# Patient Record
Sex: FEMALE | URBAN - METROPOLITAN AREA
[De-identification: names, ages, dates, MRNs, and addresses within clinical notes are randomized per-mention and may not be internally consistent; named-entity substitution may affect disease eponyms.]

---

## 2023-12-20 LAB
ABO, EXTERNAL RESULT: NORMAL
C. TRACHOMATIS, EXTERNAL RESULT: NORMAL
HEP B, EXTERNAL RESULT: NORMAL
HIV, EXTERNAL RESULT: NORMAL
N. GONORRHOEAE, EXTERNAL RESULT: NORMAL
RH FACTOR, EXTERNAL RESULT: POSITIVE
RPR, EXTERNAL RESULT: NORMAL
RUBELLA TITER, EXTERNAL RESULT: NORMAL

## 2024-07-02 LAB — GBS, EXTERNAL RESULT: NORMAL

## 2024-07-28 ENCOUNTER — ANESTHESIA EVENT (OUTPATIENT)
Facility: HOSPITAL | Age: 34
End: 2024-07-28
Payer: COMMERCIAL

## 2024-07-28 ENCOUNTER — HOSPITAL ENCOUNTER (INPATIENT)
Facility: HOSPITAL | Age: 34
LOS: 2 days | Discharge: HOME | End: 2024-07-30
Attending: OBSTETRICS & GYNECOLOGY | Admitting: OBSTETRICS & GYNECOLOGY
Payer: COMMERCIAL

## 2024-07-28 ENCOUNTER — ANESTHESIA (OUTPATIENT)
Facility: HOSPITAL | Age: 34
End: 2024-07-28
Payer: COMMERCIAL

## 2024-07-28 DIAGNOSIS — R52 PAIN: Primary | ICD-10-CM

## 2024-07-28 PROBLEM — O47.9 UTERINE CONTRACTIONS: Status: ACTIVE | Noted: 2024-07-28

## 2024-07-28 LAB
ABO + RH BLD: NORMAL
BLOOD GROUP ANTIBODIES SERPL: NORMAL
ERYTHROCYTE [DISTWIDTH] IN BLOOD BY AUTOMATED COUNT: 13.8 % (ref 11.5–14.5)
HCT VFR BLD AUTO: 35.7 % (ref 35–47)
HGB BLD-MCNC: 12.3 G/DL (ref 11.5–16)
MCH RBC QN AUTO: 30 PG (ref 26–34)
MCHC RBC AUTO-ENTMCNC: 34.5 G/DL (ref 30–36.5)
MCV RBC AUTO: 87.1 FL (ref 80–99)
NRBC # BLD: 0 K/UL (ref 0–0.01)
NRBC BLD-RTO: 0 PER 100 WBC
PLATELET # BLD AUTO: 187 K/UL (ref 150–400)
PMV BLD AUTO: 12 FL (ref 8.9–12.9)
RBC # BLD AUTO: 4.1 M/UL (ref 3.8–5.2)
RPR SER QL: NONREACTIVE
SPECIMEN EXP DATE BLD: NORMAL
WBC # BLD AUTO: 9.6 K/UL (ref 3.6–11)

## 2024-07-28 PROCEDURE — 6370000000 HC RX 637 (ALT 250 FOR IP): Performed by: ADVANCED PRACTICE MIDWIFE

## 2024-07-28 PROCEDURE — 7100000001 HC PACU RECOVERY - ADDTL 15 MIN

## 2024-07-28 PROCEDURE — 4500000002 HC ER NO CHARGE

## 2024-07-28 PROCEDURE — 3700000025 EPIDURAL BLOCK: Performed by: ANESTHESIOLOGY

## 2024-07-28 PROCEDURE — 7100000000 HC PACU RECOVERY - FIRST 15 MIN

## 2024-07-28 PROCEDURE — 51701 INSERT BLADDER CATHETER: CPT

## 2024-07-28 PROCEDURE — 1100000000 HC RM PRIVATE

## 2024-07-28 PROCEDURE — 6370000000 HC RX 637 (ALT 250 FOR IP): Performed by: MIDWIFE

## 2024-07-28 PROCEDURE — 7220000101 HC DELIVERY VAGINAL/SINGLE

## 2024-07-28 PROCEDURE — 6360000002 HC RX W HCPCS: Performed by: ADVANCED PRACTICE MIDWIFE

## 2024-07-28 PROCEDURE — 36415 COLL VENOUS BLD VENIPUNCTURE: CPT

## 2024-07-28 PROCEDURE — 85027 COMPLETE CBC AUTOMATED: CPT

## 2024-07-28 PROCEDURE — 86901 BLOOD TYPING SEROLOGIC RH(D): CPT

## 2024-07-28 PROCEDURE — 7210000100 HC LABOR FEE PER 1 HR

## 2024-07-28 PROCEDURE — 2500000003 HC RX 250 WO HCPCS: Performed by: ANESTHESIOLOGY

## 2024-07-28 PROCEDURE — 6360000002 HC RX W HCPCS: Performed by: ANESTHESIOLOGY

## 2024-07-28 PROCEDURE — 2580000003 HC RX 258: Performed by: MIDWIFE

## 2024-07-28 PROCEDURE — 0HQ9XZZ REPAIR PERINEUM SKIN, EXTERNAL APPROACH: ICD-10-PCS | Performed by: OBSTETRICS & GYNECOLOGY

## 2024-07-28 PROCEDURE — 10907ZC DRAINAGE OF AMNIOTIC FLUID, THERAPEUTIC FROM PRODUCTS OF CONCEPTION, VIA NATURAL OR ARTIFICIAL OPENING: ICD-10-PCS | Performed by: OBSTETRICS & GYNECOLOGY

## 2024-07-28 PROCEDURE — 2580000003 HC RX 258: Performed by: ANESTHESIOLOGY

## 2024-07-28 PROCEDURE — 86900 BLOOD TYPING SEROLOGIC ABO: CPT

## 2024-07-28 PROCEDURE — 86592 SYPHILIS TEST NON-TREP QUAL: CPT

## 2024-07-28 PROCEDURE — 86850 RBC ANTIBODY SCREEN: CPT

## 2024-07-28 PROCEDURE — 00HU33Z INSERTION OF INFUSION DEVICE INTO SPINAL CANAL, PERCUTANEOUS APPROACH: ICD-10-PCS | Performed by: ANESTHESIOLOGY

## 2024-07-28 RX ORDER — SODIUM CHLORIDE, SODIUM LACTATE, POTASSIUM CHLORIDE, CALCIUM CHLORIDE 600; 310; 30; 20 MG/100ML; MG/100ML; MG/100ML; MG/100ML
INJECTION, SOLUTION INTRAVENOUS CONTINUOUS
Status: DISCONTINUED | OUTPATIENT
Start: 2024-07-28 | End: 2024-07-30 | Stop reason: HOSPADM

## 2024-07-28 RX ORDER — OXYCODONE HYDROCHLORIDE 5 MG/1
10 TABLET ORAL EVERY 4 HOURS PRN
Status: DISCONTINUED | OUTPATIENT
Start: 2024-07-28 | End: 2024-07-30 | Stop reason: HOSPADM

## 2024-07-28 RX ORDER — SODIUM CHLORIDE 9 MG/ML
INJECTION, SOLUTION INTRAVENOUS PRN
Status: DISCONTINUED | OUTPATIENT
Start: 2024-07-28 | End: 2024-07-30 | Stop reason: HOSPADM

## 2024-07-28 RX ORDER — EPHEDRINE SULFATE 50 MG/ML
10 INJECTION INTRAVENOUS
Status: DISPENSED | OUTPATIENT
Start: 2024-07-28 | End: 2024-07-29

## 2024-07-28 RX ORDER — HYDROMORPHONE HYDROCHLORIDE 1 MG/ML
1 INJECTION, SOLUTION INTRAMUSCULAR; INTRAVENOUS; SUBCUTANEOUS ONCE
Status: DISCONTINUED | OUTPATIENT
Start: 2024-07-28 | End: 2024-07-29 | Stop reason: SDUPTHER

## 2024-07-28 RX ORDER — SODIUM CHLORIDE, SODIUM LACTATE, POTASSIUM CHLORIDE, AND CALCIUM CHLORIDE .6; .31; .03; .02 G/100ML; G/100ML; G/100ML; G/100ML
500 INJECTION, SOLUTION INTRAVENOUS PRN
Status: DISCONTINUED | OUTPATIENT
Start: 2024-07-28 | End: 2024-07-30 | Stop reason: HOSPADM

## 2024-07-28 RX ORDER — ONDANSETRON 2 MG/ML
4 INJECTION INTRAMUSCULAR; INTRAVENOUS EVERY 6 HOURS PRN
Status: DISCONTINUED | OUTPATIENT
Start: 2024-07-28 | End: 2024-07-30 | Stop reason: HOSPADM

## 2024-07-28 RX ORDER — ACETAMINOPHEN 325 MG/1
650 TABLET ORAL EVERY 4 HOURS PRN
Status: DISCONTINUED | OUTPATIENT
Start: 2024-07-28 | End: 2024-07-29 | Stop reason: SDUPTHER

## 2024-07-28 RX ORDER — LIDOCAINE HYDROCHLORIDE 10 MG/ML
INJECTION, SOLUTION INFILTRATION; PERINEURAL
Status: DISCONTINUED
Start: 2024-07-28 | End: 2024-07-29 | Stop reason: SDUPTHER

## 2024-07-28 RX ORDER — BUPIVACAINE HYDROCHLORIDE 2.5 MG/ML
INJECTION, SOLUTION EPIDURAL; INFILTRATION; INTRACAUDAL PRN
Status: DISCONTINUED | OUTPATIENT
Start: 2024-07-28 | End: 2024-07-28 | Stop reason: SDUPTHER

## 2024-07-28 RX ORDER — BUPIVACAINE HYDROCHLORIDE 2.5 MG/ML
INJECTION, SOLUTION EPIDURAL; INFILTRATION; INTRACAUDAL
Status: COMPLETED
Start: 2024-07-28 | End: 2024-07-28

## 2024-07-28 RX ORDER — FENTANYL/BUPIVACAINE/NS/PF 2-1250MCG
1-15 PLASTIC BAG, INJECTION (ML) INJECTION CONTINUOUS
Status: DISCONTINUED | OUTPATIENT
Start: 2024-07-28 | End: 2024-07-29 | Stop reason: ALTCHOICE

## 2024-07-28 RX ORDER — LIDOCAINE HCL/EPINEPHRINE/PF 2%-1:200K
VIAL (ML) INJECTION PRN
Status: DISCONTINUED | OUTPATIENT
Start: 2024-07-28 | End: 2024-07-28 | Stop reason: SDUPTHER

## 2024-07-28 RX ORDER — TERBUTALINE SULFATE 1 MG/ML
0.25 INJECTION, SOLUTION SUBCUTANEOUS
Status: ACTIVE | OUTPATIENT
Start: 2024-07-28 | End: 2024-07-29

## 2024-07-28 RX ORDER — NALOXONE HYDROCHLORIDE 0.4 MG/ML
INJECTION, SOLUTION INTRAMUSCULAR; INTRAVENOUS; SUBCUTANEOUS PRN
Status: DISCONTINUED | OUTPATIENT
Start: 2024-07-28 | End: 2024-07-30 | Stop reason: HOSPADM

## 2024-07-28 RX ORDER — SODIUM CHLORIDE 0.9 % (FLUSH) 0.9 %
5-40 SYRINGE (ML) INJECTION EVERY 12 HOURS SCHEDULED
Status: DISCONTINUED | OUTPATIENT
Start: 2024-07-28 | End: 2024-07-30 | Stop reason: HOSPADM

## 2024-07-28 RX ORDER — CARBOPROST TROMETHAMINE 250 UG/ML
250 INJECTION, SOLUTION INTRAMUSCULAR PRN
Status: DISCONTINUED | OUTPATIENT
Start: 2024-07-28 | End: 2024-07-30 | Stop reason: HOSPADM

## 2024-07-28 RX ORDER — DOCUSATE SODIUM 100 MG/1
100 CAPSULE, LIQUID FILLED ORAL 2 TIMES DAILY
Status: DISCONTINUED | OUTPATIENT
Start: 2024-07-28 | End: 2024-07-30 | Stop reason: HOSPADM

## 2024-07-28 RX ORDER — IBUPROFEN 400 MG/1
800 TABLET ORAL EVERY 8 HOURS SCHEDULED
Status: DISCONTINUED | OUTPATIENT
Start: 2024-07-28 | End: 2024-07-30 | Stop reason: HOSPADM

## 2024-07-28 RX ORDER — OXYCODONE HYDROCHLORIDE 5 MG/1
5 TABLET ORAL EVERY 4 HOURS PRN
Status: DISCONTINUED | OUTPATIENT
Start: 2024-07-28 | End: 2024-07-30 | Stop reason: HOSPADM

## 2024-07-28 RX ORDER — FENTANYL CITRATE 50 UG/ML
INJECTION, SOLUTION INTRAMUSCULAR; INTRAVENOUS
Status: DISPENSED
Start: 2024-07-28 | End: 2024-07-29

## 2024-07-28 RX ORDER — ACETAMINOPHEN 500 MG
1000 TABLET ORAL EVERY 8 HOURS SCHEDULED
Status: DISCONTINUED | OUTPATIENT
Start: 2024-07-28 | End: 2024-07-30 | Stop reason: HOSPADM

## 2024-07-28 RX ORDER — SODIUM CHLORIDE 0.9 % (FLUSH) 0.9 %
5-40 SYRINGE (ML) INJECTION PRN
Status: DISCONTINUED | OUTPATIENT
Start: 2024-07-28 | End: 2024-07-30 | Stop reason: HOSPADM

## 2024-07-28 RX ORDER — ONDANSETRON 4 MG/1
4 TABLET, ORALLY DISINTEGRATING ORAL EVERY 6 HOURS PRN
Status: DISCONTINUED | OUTPATIENT
Start: 2024-07-28 | End: 2024-07-30 | Stop reason: HOSPADM

## 2024-07-28 RX ORDER — LIDOCAINE HYDROCHLORIDE 10 MG/ML
30 INJECTION, SOLUTION EPIDURAL; INFILTRATION; INTRACAUDAL; PERINEURAL PRN
Status: DISCONTINUED | OUTPATIENT
Start: 2024-07-28 | End: 2024-07-30 | Stop reason: HOSPADM

## 2024-07-28 RX ORDER — LIDOCAINE HCL/EPINEPHRINE/PF 2%-1:200K
VIAL (ML) INJECTION
Status: COMPLETED
Start: 2024-07-28 | End: 2024-07-28

## 2024-07-28 RX ORDER — MISOPROSTOL 200 UG/1
400 TABLET ORAL PRN
Status: DISCONTINUED | OUTPATIENT
Start: 2024-07-28 | End: 2024-07-30 | Stop reason: HOSPADM

## 2024-07-28 RX ORDER — SODIUM CHLORIDE, SODIUM LACTATE, POTASSIUM CHLORIDE, CALCIUM CHLORIDE 600; 310; 30; 20 MG/100ML; MG/100ML; MG/100ML; MG/100ML
INJECTION, SOLUTION INTRAVENOUS CONTINUOUS
Status: DISCONTINUED | OUTPATIENT
Start: 2024-07-28 | End: 2024-07-29 | Stop reason: SDUPTHER

## 2024-07-28 RX ORDER — SODIUM CHLORIDE 0.9 % (FLUSH) 0.9 %
5-40 SYRINGE (ML) INJECTION EVERY 12 HOURS SCHEDULED
Status: DISCONTINUED | OUTPATIENT
Start: 2024-07-28 | End: 2024-07-29 | Stop reason: SDUPTHER

## 2024-07-28 RX ORDER — METHYLERGONOVINE MALEATE 0.2 MG/ML
200 INJECTION INTRAVENOUS PRN
Status: DISCONTINUED | OUTPATIENT
Start: 2024-07-28 | End: 2024-07-30 | Stop reason: HOSPADM

## 2024-07-28 RX ORDER — MODIFIED LANOLIN
OINTMENT (GRAM) TOPICAL PRN
Status: DISCONTINUED | OUTPATIENT
Start: 2024-07-28 | End: 2024-07-30 | Stop reason: HOSPADM

## 2024-07-28 RX ORDER — SODIUM CHLORIDE 0.9 % (FLUSH) 0.9 %
5-40 SYRINGE (ML) INJECTION PRN
Status: DISCONTINUED | OUTPATIENT
Start: 2024-07-28 | End: 2024-07-29 | Stop reason: SDUPTHER

## 2024-07-28 RX ORDER — SODIUM CHLORIDE 9 MG/ML
25 INJECTION, SOLUTION INTRAVENOUS PRN
Status: DISCONTINUED | OUTPATIENT
Start: 2024-07-28 | End: 2024-07-29 | Stop reason: SDUPTHER

## 2024-07-28 RX ORDER — ONDANSETRON 2 MG/ML
4 INJECTION INTRAMUSCULAR; INTRAVENOUS EVERY 6 HOURS PRN
Status: DISCONTINUED | OUTPATIENT
Start: 2024-07-28 | End: 2024-07-29 | Stop reason: SDUPTHER

## 2024-07-28 RX ORDER — SEVOFLURANE 250 ML/250ML
1 LIQUID RESPIRATORY (INHALATION) CONTINUOUS PRN
Status: DISCONTINUED | OUTPATIENT
Start: 2024-07-28 | End: 2024-07-30 | Stop reason: HOSPADM

## 2024-07-28 RX ORDER — ONDANSETRON 4 MG/1
4 TABLET, ORALLY DISINTEGRATING ORAL EVERY 6 HOURS PRN
Status: DISCONTINUED | OUTPATIENT
Start: 2024-07-28 | End: 2024-07-29 | Stop reason: SDUPTHER

## 2024-07-28 RX ORDER — FENTANYL CITRATE 50 UG/ML
INJECTION, SOLUTION INTRAMUSCULAR; INTRAVENOUS PRN
Status: DISCONTINUED | OUTPATIENT
Start: 2024-07-28 | End: 2024-07-28 | Stop reason: SDUPTHER

## 2024-07-28 RX ORDER — DIPHENHYDRAMINE HCL 25 MG
25 CAPSULE ORAL EVERY 4 HOURS PRN
Status: DISCONTINUED | OUTPATIENT
Start: 2024-07-28 | End: 2024-07-30 | Stop reason: HOSPADM

## 2024-07-28 RX ADMIN — LIDOCAINE HYDROCHLORIDE AND EPINEPHRINE 2 ML: 20; 5 INJECTION, SOLUTION EPIDURAL; INFILTRATION; INTRACAUDAL; PERINEURAL at 04:07

## 2024-07-28 RX ADMIN — FENTANYL CITRATE 100 MCG: 50 INJECTION, SOLUTION INTRAMUSCULAR; INTRAVENOUS at 18:53

## 2024-07-28 RX ADMIN — FENTANYL CITRATE 100 MCG: 50 INJECTION, SOLUTION INTRAMUSCULAR; INTRAVENOUS at 19:49

## 2024-07-28 RX ADMIN — Medication 2 MILLI-UNITS/MIN: at 10:39

## 2024-07-28 RX ADMIN — BUPIVACAINE HYDROCHLORIDE 10 ML/HR: 5 INJECTION, SOLUTION EPIDURAL; INTRACAUDAL; PERINEURAL at 04:37

## 2024-07-28 RX ADMIN — BUPIVACAINE HYDROCHLORIDE 5 ML: 2.5 INJECTION, SOLUTION EPIDURAL; INFILTRATION; INTRACAUDAL; PERINEURAL at 18:53

## 2024-07-28 RX ADMIN — SODIUM CHLORIDE, POTASSIUM CHLORIDE, SODIUM LACTATE AND CALCIUM CHLORIDE: 600; 310; 30; 20 INJECTION, SOLUTION INTRAVENOUS at 03:26

## 2024-07-28 RX ADMIN — BUPIVACAINE HYDROCHLORIDE 7 ML: 2.5 INJECTION, SOLUTION EPIDURAL; INFILTRATION; INTRACAUDAL; PERINEURAL at 04:11

## 2024-07-28 RX ADMIN — BUPIVACAINE HYDROCHLORIDE 7 ML: 2.5 INJECTION, SOLUTION EPIDURAL; INFILTRATION; INTRACAUDAL; PERINEURAL at 19:47

## 2024-07-28 RX ADMIN — ACETAMINOPHEN 1000 MG: 500 TABLET ORAL at 23:23

## 2024-07-28 RX ADMIN — SODIUM CHLORIDE, POTASSIUM CHLORIDE, SODIUM LACTATE AND CALCIUM CHLORIDE: 600; 310; 30; 20 INJECTION, SOLUTION INTRAVENOUS at 04:26

## 2024-07-28 RX ADMIN — Medication 166.7 ML: at 22:55

## 2024-07-28 RX ADMIN — ACETAMINOPHEN 650 MG: 325 TABLET ORAL at 05:24

## 2024-07-28 RX ADMIN — BUPIVACAINE HYDROCHLORIDE 10 ML/HR: 5 INJECTION, SOLUTION EPIDURAL; INTRACAUDAL; PERINEURAL at 13:11

## 2024-07-28 RX ADMIN — BUPIVACAINE HYDROCHLORIDE 10 ML/HR: 5 INJECTION, SOLUTION EPIDURAL; INTRACAUDAL; PERINEURAL at 19:25

## 2024-07-28 RX ADMIN — IBUPROFEN 800 MG: 400 TABLET, FILM COATED ORAL at 23:23

## 2024-07-28 NOTE — ANESTHESIA PROCEDURE NOTES
Epidural Block    Patient location during procedure: OB  Start time: 7/28/2024 4:01 AM  End time: 7/28/2024 4:11 AM  Reason for block: labor epidural  Staffing  Anesthesiologist: Vince Ziegler DO  Performed by: Vince Ziegler DO  Authorized by: Vince Ziegler DO    Epidural  Patient position: sitting  Prep: DuraPrep  Patient monitoring: continuous pulse ox and frequent blood pressure checks  Approach: midline  Location: L3-4  Injection technique: ALEXSANDRA air  Provider prep: mask and sterile gloves  Needle  Needle type: Tuohy   Needle gauge: 17 G  Needle length: 3.5 in  Needle insertion depth: 7 cm  Catheter type: end hole  Catheter size: 18 G  Catheter at skin depth: 12 cm  Test dose: negativeCatheter Secured: tegaderm and tape  Assessment  Sensory level: T10  Hemodynamics: stable  Attempts: 1  Outcomes: uncomplicated and patient tolerated procedure well  Preanesthetic Checklist  Completed: patient identified, IV checked, site marked, risks and benefits discussed, surgical/procedural consents, equipment checked, pre-op evaluation, timeout performed, anesthesia consent given, oxygen available, monitors applied/VS acknowledged, fire risk safety assessment completed and verbalized and blood product R/B/A discussed and consented

## 2024-07-28 NOTE — ED NOTES
HPI Patient is a 33 y.o.  @ 40.3w IUP who presents to the [facility] at 0231 reporting contractions which have worsened since 2200 last night. Today, she reports good fetal movement and denies vaginal bleeding, leaking of fluid, nausea/vomiting/diarrhea, fever, cough, or sore throat. She reports regular prenatal care with [facility] and denies any issues with this pregnancy. She admits to failing her 1hr GCT (154) but passing her 3hr GTT. She was seen in the office last week and was found to be 3cms       Chief Complaint   Patient presents with    Contractions       No past medical history on file.    No past surgical history on file.      No family history on file.    Social History     Socioeconomic History    Marital status: Not on file     Spouse name: Not on file    Number of children: Not on file    Years of education: Not on file    Highest education level: Not on file   Occupational History    Not on file   Tobacco Use    Smoking status: Never    Smokeless tobacco: Never   Substance and Sexual Activity    Alcohol use: Not Currently    Drug use: Never    Sexual activity: Yes   Other Topics Concern    Not on file   Social History Narrative    Not on file     Social Determinants of Health     Financial Resource Strain: Not on file   Food Insecurity: Not on file   Transportation Needs: Not on file   Physical Activity: Not on file   Stress: Not on file   Social Connections: Not on file   Intimate Partner Violence: Not on file   Housing Stability: Not on file       ALLERGIES: Patient has no known allergies.    Review of Systems   Genitourinary:         Pregnancy  Contractions   All other systems reviewed and are negative.      Vitals:    24 0232   BP: 118/76   Pulse: 59   Resp: 16   Temp: 98.2 °F (36.8 °C)   TempSrc: Oral   SpO2: 100%            Physical Exam  Vitals and nursing note reviewed.   Constitutional:       Appearance: Normal appearance. She is normal weight.   HENT:      Head: Normocephalic.

## 2024-07-28 NOTE — ED TRIAGE NOTES
0245-Report received from JOCELYN Montemayor RN     0252-MAXIMINO Noonan CNM at bedside assesing and talking with client.   SVE performed 5/100/0

## 2024-07-28 NOTE — H&P
Connections: Not on file   Intimate Partner Violence: Not on file   Housing Stability: Not on file       OBJECTIVE:  /76   Pulse 59   Temp 98.2 °F (36.8 °C) (Oral)   Resp 16   Ht 1.676 m (5' 6\")   Wt 84.8 kg (187 lb)   SpO2 100%   BMI 30.18 kg/m²     FHR baseline 135. 15x15 Accels present. No decels present   Ctx: TOCO: q 4-6min. Mod-strong with palpation. Appropriate resting tone. No tachysystole noted     Exam:  General: alert & oriented x3  HEENT:  normal   Lungs:  unlabored breathing  Cor:  RRR  Abdomen:  Soft, non-tender                    S=D                    Clinical EFW 3600gms   Cervix:  5/100/0, vertex  Membranes:  Bulging  Pelvimetry:  AP-good                      Arch- adequate                      Sidewalls- adequate                      Pelvis appears adequate   Extremities:  normal, no edema       Impression: IUP at 40.3  Active labor  Cat I tracing  GBS NEG    Plan:  Admit  Review and sign consents  CBC, Type & Screen, RPR  Plans epidural for pain management  Continuous monitoring and/or intermittent monitoring per protocol  Reviewed plan with patient/partner, all questions answered       INOCENCIO Stockton CNM  3:13 AM

## 2024-07-28 NOTE — ANESTHESIA PRE PROCEDURE
Department of Anesthesiology  Preprocedure Note       Name:  Ela Smyth   Age:  33 y.o.  :  1990                                          MRN:  [id number]         Date:  2024      Surgeon: * No surgeons listed *    Procedure: * No procedures listed *    Medications prior to admission:   Prior to Admission medications    Medication Sig Start Date End Date Taking? Authorizing Provider   Prenatal Vit w/Xk-Srxhzevqh-DK (PNV PO) Take 1 tablet by mouth daily   Yes Provider, MD Aiden       Current medications:    Current Facility-Administered Medications   Medication Dose Route Frequency Provider Last Rate Last Admin    terbutaline (BRETHINE) injection 0.25 mg  0.25 mg SubCUTAneous Once PRN Saran, Aidee L, APRN - CNM        lactated ringers IV soln infusion   IntraVENous Continuous Saran, Aidee L, APRN -  mL/hr at 24 0326 New Bag at 24 0326    lactated ringers bolus 500 mL  500 mL IntraVENous PRN Saran, Aidee L, APRN - CNM        Or    lactated ringers bolus 500 mL  500 mL IntraVENous PRN Saran, Aidee L, APRN - .7 mL/hr at 24 0329 Rate Change at 24 0329    sodium chloride flush 0.9 % injection 5-40 mL  5-40 mL IntraVENous 2 times per day Saran, Aidee L, APRN - CNM        sodium chloride flush 0.9 % injection 5-40 mL  5-40 mL IntraVENous PRN Saran, Aidee L, APRN - CNM        0.9 % sodium chloride infusion  25 mL IntraVENous PRN Saran, Aidee L, APRN - CNM        ondansetron (ZOFRAN) injection 4 mg  4 mg IntraVENous Q6H PRN Saran, Aidee L, APRN - CNM        Or    ondansetron (ZOFRAN-ODT) disintegrating tablet 4 mg  4 mg Oral Q6H PRN Saran, Aidee L, APRN - CNM        oxytocin (PITOCIN) 30 units in 500 mL infusion  87.3 susi-units/min IntraVENous Continuous PRN Saran, Aidee L, APRN - CNM        And    oxytocin (PITOCIN) 10 unit bolus from the bag  10 Units IntraVENous PRN Saran, Aidee L, APRN - CNM        methylergonovine (METHERGINE) injection

## 2024-07-29 PROCEDURE — 6370000000 HC RX 637 (ALT 250 FOR IP): Performed by: ADVANCED PRACTICE MIDWIFE

## 2024-07-29 PROCEDURE — 1120000000 HC RM PRIVATE OB

## 2024-07-29 RX ADMIN — OXYCODONE HYDROCHLORIDE 5 MG: 5 TABLET ORAL at 17:16

## 2024-07-29 RX ADMIN — OXYCODONE HYDROCHLORIDE 5 MG: 5 TABLET ORAL at 02:48

## 2024-07-29 RX ADMIN — ACETAMINOPHEN 1000 MG: 500 TABLET ORAL at 19:07

## 2024-07-29 RX ADMIN — OXYCODONE HYDROCHLORIDE 5 MG: 5 TABLET ORAL at 11:00

## 2024-07-29 RX ADMIN — DOCUSATE SODIUM 100 MG: 100 CAPSULE, LIQUID FILLED ORAL at 11:00

## 2024-07-29 RX ADMIN — OXYCODONE HYDROCHLORIDE 5 MG: 5 TABLET ORAL at 21:40

## 2024-07-29 RX ADMIN — IBUPROFEN 800 MG: 400 TABLET, FILM COATED ORAL at 07:01

## 2024-07-29 RX ADMIN — IBUPROFEN 800 MG: 400 TABLET, FILM COATED ORAL at 15:08

## 2024-07-29 RX ADMIN — ACETAMINOPHEN 1000 MG: 500 TABLET ORAL at 10:59

## 2024-07-29 RX ADMIN — OXYCODONE HYDROCHLORIDE 5 MG: 5 TABLET ORAL at 07:01

## 2024-07-29 RX ADMIN — IBUPROFEN 800 MG: 400 TABLET, FILM COATED ORAL at 23:13

## 2024-07-29 RX ADMIN — DOCUSATE SODIUM 100 MG: 100 CAPSULE, LIQUID FILLED ORAL at 20:52

## 2024-07-29 ASSESSMENT — PAIN SCALES - GENERAL
PAINLEVEL_OUTOF10: 4
PAINLEVEL_OUTOF10: 6
PAINLEVEL_OUTOF10: 4
PAINLEVEL_OUTOF10: 5

## 2024-07-29 ASSESSMENT — PAIN DESCRIPTION - LOCATION
LOCATION: ABDOMEN;BACK
LOCATION: ABDOMEN
LOCATION: ABDOMEN
LOCATION: BACK;ABDOMEN

## 2024-07-29 ASSESSMENT — PAIN - FUNCTIONAL ASSESSMENT
PAIN_FUNCTIONAL_ASSESSMENT: ACTIVITIES ARE NOT PREVENTED

## 2024-07-29 ASSESSMENT — PAIN DESCRIPTION - ORIENTATION
ORIENTATION: LOWER

## 2024-07-29 ASSESSMENT — PAIN DESCRIPTION - DESCRIPTORS
DESCRIPTORS: CRAMPING;SHOOTING
DESCRIPTORS: CRAMPING
DESCRIPTORS: CRAMPING;ACHING
DESCRIPTORS: CRAMPING

## 2024-07-29 NOTE — L&D DELIVERY NOTE
Became Complete and +2, with strong urge to push. Pushed for 90 mins.  Live Male infant. Over second degree perineum. Infant placed to maternal abdomen. Infant dried and stimulated, spontaneous cry. Cord allowed to cease pulsations, then doubly clamped and cut per FOB. 3VC. Cordblood Obtained yes. Placenta and cord, spont cinthya, Intact. EBL <500 ml .All counts correct yes. To RR, Stable.   \"Kaur\"      ROJAS Smyth Ela [[id number]]      Labor Events     Labor: No   Steroids: None  Cervical Ripening Date/Time:        Rupture Date/Time:  24 17:42:00   Rupture Type: AROM  Fluid Color: Clear  Fluid Odor: None  Induction: None  Augmentation: AROM, Oxytocin              Anesthesia    Method: Epidural       Labor Event Times      Labor onset date/time:  24 21:00:00     Dilation complete date/time:  24 20:15:00 EDT     Start pushing date/time:     Decision date/time (emergent ):            Delivery Details      Delivery Date: 24 Delivery Time: 22:43:00   Delivery Type: Vaginal, Spontaneous               Presentation    Presentation: Vertex       Shoulder Dystocia    Shoulder Dystocia Present?: No       Assisted Delivery Details    Forceps Attempted?: No  Vacuum Extractor Attempted?: No                           Cord    Vessels: 3 Vessels  Complications: None  Delayed Cord Clamping?: Yes  Cord Clamped Date/Time: 2024 22:49:44  Cord Blood Disposition: Lab  Gases Sent?: No              Placenta    Date/Time: 2024 22:55:52  Removal: Spontaneous  Appearance: Intact  Disposition: Discarded       Lacerations    Perineal Lacerations: 1st  Number of Repair Packets: 1       Vaginal Counts    Initial Count Personnel: MARCO A YATES  Initial Count Verified By: MAXIMINO COHEN RN  Intial Sponge Count: Correct Intial Needles Count: Correct Intial Instruments Count: Correct          Blood Loss  Mother: Keerthisujathakevan Ela #[id number]     Start of Mother's Information      Delivery

## 2024-07-29 NOTE — ANESTHESIA POSTPROCEDURE EVALUATION
Department of Anesthesiology  Postprocedure Note    Patient: Ela Smyth  MRN: [id number]  YOB: 1990  Date of evaluation: 7/29/2024    Procedure Summary       Date: 07/28/24 Room / Location:     Anesthesia Start: 0401 Anesthesia Stop: 2243    Procedure: Labor Analgesia Diagnosis:     Scheduled Providers:  Responsible Provider: Vince Ziegler DO    Anesthesia Type: epidural ASA Status: 2            Anesthesia Type: No value filed.    Camilla Phase I:      Camilla Phase II:      Anesthesia Post Evaluation    Patient location during evaluation: bedside  Level of consciousness: awake  Pain score: 0  Airway patency: patent  Nausea & Vomiting: no nausea  Cardiovascular status: hemodynamically stable  Respiratory status: acceptable  Hydration status: euvolemic  Pain management: adequate    No notable events documented.

## 2024-07-29 NOTE — LACTATION NOTE
This note was copied from a baby's chart.  Infant born vaginally last night to a  mom at 40 weeks gestation. Mom has a negative history impacting lactation and noted breast changes during the pregnancy. Mom has been using a nipple shield for feeds. Infant sleeping at the time of my visit and ate an hour prior to visit; plan to follow up for next feeding.   Educated mom on normal  behaviors and feeding patterns to include cluster feeding.

## 2024-07-30 VITALS
RESPIRATION RATE: 16 BRPM | HEIGHT: 66 IN | BODY MASS INDEX: 30.05 KG/M2 | OXYGEN SATURATION: 97 % | SYSTOLIC BLOOD PRESSURE: 108 MMHG | HEART RATE: 70 BPM | TEMPERATURE: 98 F | WEIGHT: 187 LBS | DIASTOLIC BLOOD PRESSURE: 76 MMHG

## 2024-07-30 PROCEDURE — 6370000000 HC RX 637 (ALT 250 FOR IP): Performed by: ADVANCED PRACTICE MIDWIFE

## 2024-07-30 RX ORDER — IBUPROFEN 800 MG/1
800 TABLET ORAL EVERY 8 HOURS SCHEDULED
Qty: 30 TABLET | Refills: 1 | Status: SHIPPED | OUTPATIENT
Start: 2024-07-30

## 2024-07-30 RX ORDER — OXYCODONE HYDROCHLORIDE 5 MG/1
5 TABLET ORAL EVERY 4 HOURS PRN
Qty: 20 TABLET | Refills: 0 | Status: SHIPPED | OUTPATIENT
Start: 2024-07-30 | End: 2024-08-06

## 2024-07-30 RX ADMIN — ACETAMINOPHEN 1000 MG: 500 TABLET ORAL at 10:45

## 2024-07-30 RX ADMIN — DOCUSATE SODIUM 100 MG: 100 CAPSULE, LIQUID FILLED ORAL at 10:23

## 2024-07-30 RX ADMIN — ACETAMINOPHEN 1000 MG: 500 TABLET ORAL at 03:00

## 2024-07-30 RX ADMIN — IBUPROFEN 800 MG: 400 TABLET, FILM COATED ORAL at 06:55

## 2024-07-30 RX ADMIN — OXYCODONE HYDROCHLORIDE 5 MG: 5 TABLET ORAL at 06:55

## 2024-07-30 ASSESSMENT — PAIN SCALES - GENERAL
PAINLEVEL_OUTOF10: 4
PAINLEVEL_OUTOF10: 6

## 2024-07-30 ASSESSMENT — PAIN DESCRIPTION - LOCATION
LOCATION: ABDOMEN;BACK
LOCATION: ABDOMEN;BACK

## 2024-07-30 ASSESSMENT — PAIN DESCRIPTION - ORIENTATION
ORIENTATION: LOWER
ORIENTATION: LOWER

## 2024-07-30 ASSESSMENT — PAIN DESCRIPTION - DESCRIPTORS
DESCRIPTORS: CRAMPING;ACHING
DESCRIPTORS: CRAMPING;ACHING

## 2024-07-30 NOTE — PROGRESS NOTES
bedside report from CORDELL Brantley RN   anesthesia at bedside for redose   2015 SVE complete Vipul Bynum CNM, plan to labor down for about an hour, on left side with peanut ball between knees    right side peanut ball between knees    start pushing with contractions   3  vigorous baby boy  0115 TRANSFER - OUT REPORT:    Verbal report given to Yo ISABEL on Ela Smyth  being transferred to [facility] for routine progression of patient care       Report consisted of patient's Situation, Background, Assessment and   Recommendations(SBAR).     Information from the following report(s) Nurse Handoff Report, MAR, and Event Log was reviewed with the receiving nurse.           Lines:   Peripheral IV 24 Left;Posterior Hand (Active)   Site Assessment Clean, dry & intact 24   Line Status Infusing 24   Line Care Connections checked and tightened 24   Phlebitis Assessment No symptoms 24   Infiltration Assessment 0 24   Dressing Status Clean, dry & intact 24   Dressing Type Transparent 24        Opportunity for questions and clarification was provided.      Patient transported with:  Registered Nurse  The information on the  identification bands has been checked with the mother, verifying that all information and spelling is correct. Matching identification bands are present on the , mother, and support person and have been verified by transferring nurse, MAXIMINO Camarena RN, and receiving nurse, Yo ISABEL.           
0735: Report received from SHELLEY Burton RN. Pt resting with  resting at bs    0833: Straight cath 200mL    0840: Reposition right lateral exaggerated runners with peanut ball     1000: RN at bs. SVE 5/100/0. Unchanged from previous exam. CNM at bs. Discussing POC. Plan for pitocin augmentation; All of pt's questions answered; pt verbalizes understanding and has no further questions.     1015: Reposition to left lateral    1145: Straight cath 500mL. Reposition right lateral     1340: Reposition left lateral    1355: CNM to bs. SVE 7/100/0.     1455: Reposition right lateral    1630: Reposition left lateral    1740: CNM at bs. SVE 7/100/0. AROM moderate amount of clear odorless fluid. IUPC placed     1755: Pt breathing through ctx. Pt using epidural bolus button    1800: Pt repositioned to throne    1830: MD Morse at bs for epidural redose    1847: Pt reports relief    1857: Pt repositioned to left side, pt c/o pain on left side    1920: Pt called out c/o increased pain with epidural    1929: MD Ziegler called to bs to assess pt    1935: MD Ziegler at bs to see pt   
Labor Progress Note  Patient seen  under [facility] service, fetal heart rate and contraction pattern evaluated, patient examined.  BP 99/62   Pulse 61   Temp 98 °F (36.7 °C) (Oral)   Resp 14   Ht 1.676 m (5' 6\")   Wt 84.8 kg (187 lb)   SpO2 99%   BMI 30.18 kg/m²     Physical Exam:  Cervical Exam:  deferred  Membranes:  Intact  Uterine Activity: spaced out  Fetal Heart Rate: cat 1    Assessment/Plan:  Reassuring fetal status, Continue plan for vaginal delivery    Discussed options for AROM versus pit aug - pt would like to try pit aug at this time     INOCENCIO MENJIVAR - IRVIN                    
Labor Progress Note  Patient seen, fetal heart rate and contraction pattern evaluated, patient examined.  /67   Pulse 56   Temp 98.4 °F (36.9 °C) (Oral)   Resp 15   Ht 1.676 m (5' 6\")   Wt 84.8 kg (187 lb)   SpO2 98%   BMI 30.18 kg/m²     Physical Exam:  Cervical Exam:  10/10/+1  Membranes:   clear with blood show  Uterine Activity: 2-3  Fetal Heart Rate: Cat  1    Assessment/Plan:  Reassuring fetal status, Continue plan for vaginal delivery    Pt just got dosed for pain, plan to labor down for one hour then start pushing    TYLER ADAMS, APRN - CNM                      
Labor Progress Note  Patient seen, fetal heart rate and contraction pattern evaluated, patient examined.  BP (!) 110/58   Pulse 57   Temp 98.1 °F (36.7 °C)   Resp 14   Ht 1.676 m (5' 6\")   Wt 84.8 kg (187 lb)   SpO2 99%   BMI 30.18 kg/m²     Physical Exam:  Cervical Exam:  7/C/0-+1  Membranes:  Intact  Uterine Activity: 2-4  Fetal Heart Rate: Cat 1    Assessment/Plan:  Reassuring fetal status, Continue plan for vaginal delivery    Anticipate  this evening    TYLER ADAMS, INOCENCIO - IRVIN                    
Labor Progress Note  Patient seen, fetal heart rate and contraction pattern evaluated, patient examined.  BP (!) 115/58   Pulse 56   Temp 98.2 °F (36.8 °C) (Oral)   Resp 15   Ht 1.676 m (5' 6\")   Wt 84.8 kg (187 lb)   SpO2 99%   BMI 30.18 kg/m²     Physical Exam:  Cervical Exam:  7/C/0  Membranes:  Artificial Rupture of Membranes; Amniotic Fluid: small amount of clear fluid  Uterine Activity: IUPC placed - difficult to read pattern with TOCO and no cx change since last exam  Fetal Heart Rate: Cat 1  Pit 18    Assessment/Plan:  Reassuring fetal status, Continue plan for vaginal delivery      TYLER ADAMS, INOCENCIO - IRVIN              
Post-Partum Day Number 2 Progress Note    Patient doing well post-partum without significant complaints.  Voiding without difficulty, normal lochia.    Vitals:  Patient Vitals for the past 24 hrs:   BP Temp Temp src Pulse Resp SpO2   24 1015 108/76 98 °F (36.7 °C) Oral 70 16 97 %   24 0035 123/77 97.9 °F (36.6 °C) Oral 65 16 97 %   24 2140 -- -- -- -- 16 --   24 2045 114/78 98 °F (36.7 °C) Oral 80 16 98 %   24 1610 109/74 98 °F (36.7 °C) Oral 80 16 96 %     Temp (24hrs), Av °F (36.7 °C), Min:97.9 °F (36.6 °C), Max:98 °F (36.7 °C)      Vital signs stable, afebrile.    Exam:  Patient without distress.               Abdomen soft, fundus firm, nontender               Lower extremities- nontender without edema; no cords    Labs: No results found for this or any previous visit (from the past 24 hour(s)).    Assessment and Plan:  Patient appears to be having uncomplicated post-partum course.  Discharge today-instructions reviewed.  O POSITIVE  RI/ male infant- circ deferred to Peds Urology.    
-- 97 %   07/28/24 1615 -- -- -- 60 -- 96 %   07/28/24 1610 -- -- -- 58 -- 97 %   07/28/24 1605 -- -- -- 60 -- 97 %   07/28/24 1600 -- 98 °F (36.7 °C) -- 63 -- 97 %   07/28/24 1555 -- -- -- 56 -- 99 %   07/28/24 1550 -- -- -- 54 -- 99 %   07/28/24 1545 -- -- -- 67 -- 99 %   07/28/24 1540 -- -- -- 66 -- 99 %   07/28/24 1535 -- -- -- 59 -- 97 %   07/28/24 1530 -- -- -- 56 -- 99 %   07/28/24 1525 -- -- -- 58 -- 98 %   07/28/24 1520 -- -- -- 62 -- 99 %   07/28/24 1515 -- -- -- 53 -- 98 %   07/28/24 1510 -- -- -- 55 -- 99 %   07/28/24 1505 -- -- -- 53 -- 100 %   07/28/24 1500 -- -- -- 67 -- 100 %   07/28/24 1455 (!) 115/58 98.2 °F (36.8 °C) Oral 56 15 99 %   07/28/24 1450 -- -- -- 60 -- 100 %   07/28/24 1445 -- -- -- 59 -- 98 %   07/28/24 1440 -- -- -- 61 -- 98 %   07/28/24 1435 -- -- -- 50 -- 99 %   07/28/24 1430 -- -- -- 53 -- 100 %   07/28/24 1425 -- -- -- 69 -- 100 %   07/28/24 1420 -- -- -- 59 -- 100 %   07/28/24 1415 -- -- -- 61 -- 100 %   07/28/24 1410 -- -- -- 57 -- 100 %   07/28/24 1405 -- -- -- 60 -- 100 %   07/28/24 1400 -- -- -- 70 -- 100 %   07/28/24 1355 -- -- -- 61 -- 100 %   07/28/24 1350 -- -- -- 67 -- 100 %   07/28/24 1345 -- -- -- 58 -- 100 %   07/28/24 1343 (!) 106/53 -- -- 57 -- --   07/28/24 1340 -- -- -- 98 -- 99 %   07/28/24 1335 -- -- -- 59 -- 97 %   07/28/24 1330 -- -- -- 61 -- 97 %   07/28/24 1325 -- -- -- 62 -- 99 %   07/28/24 1320 -- -- -- 60 -- 98 %   07/28/24 1315 -- -- -- 55 -- 99 %   07/28/24 1310 -- -- -- 66 -- 100 %   07/28/24 1305 -- -- -- 65 -- 99 %   07/28/24 1300 -- -- -- 65 -- 98 %   07/28/24 1255 -- -- -- 79 -- 99 %   07/28/24 1250 -- -- -- 63 -- 99 %   07/28/24 1245 -- -- -- 61 -- 100 %   07/28/24 1240 -- -- -- 62 -- 99 %   07/28/24 1235 -- -- -- 61 -- 100 %   07/28/24 1230 -- -- -- 68 -- 99 %   07/28/24 1225 -- -- -- 58 -- 100 %   07/28/24 1220 -- -- -- 54 -- 100 %   07/28/24 1215 -- -- -- 74 -- 100 %   07/28/24 1210 -- -- -- 62 -- 100 %   07/28/24 1205 -- -- -- 60 -- 100 %

## 2024-07-30 NOTE — DISCHARGE SUMMARY
Obstetrical Discharge Summary     Name: Ela Smyth MRN: [id number]  SSN: [id number]    YOB: 1990  Age: 33 y.o.  Sex: female      Allergies: Patient has no known allergies.    Admit Date: 2024    Discharge Date: 2024     Admitting Physician: Chiquita Yi MD     Attending Physician:  Tasia Angel DO     * Admission Diagnoses: Uterine contractions [O47.9]    * Discharge Diagnoses:   ROJAS Smyth [[id number]]      Events of Labor     labor?: No   steroids?: None  Cervical ripening date/time:       Rupture date/time: 24   Rupture type: AROM  Fluid color: Clear  Fluid odor: None  Induction: None  Augmentation: AROM, Oxytocin                      Additional Diagnoses:    Lab Results   Component Value Date/Time    ABORH O POSITIVE 2024 03:26 AM    There is no immunization history for the selected administration types on file for this patient.    * Procedures:   * No surgery found *           * Discharge Condition: Good    * Hospital Course: Normal hospital course following the delivery.    * Disposition: Home    Discharge Medications:      Medication List        START taking these medications      ibuprofen 800 MG tablet  Commonly known as: ADVIL;MOTRIN  Take 1 tablet by mouth every 8 hours     oxyCODONE 5 MG immediate release tablet  Commonly known as: ROXICODONE  Take 1 tablet by mouth every 4 hours as needed for Pain for up to 7 days. Max Daily Amount: 30 mg            CONTINUE taking these medications      PNV PO               Where to Get Your Medications        Information about where to get these medications is not yet available    Ask your nurse or doctor about these medications  ibuprofen 800 MG tablet  oxyCODONE 5 MG immediate release tablet         * Follow-up Care/Patient Instructions:  Activity: no sex for 6 weeks  Diet: regular diet  Wound Care: ice to area for comfort    No follow-ups on file.

## 2024-07-30 NOTE — LACTATION NOTE
This note was copied from a baby's chart.  Baby nursing well, cluster feeding and has improved throughout post partum stay, deep latch maintained, mother is comfortable, milk is in transition, baby feeding vigorously with rhythmic suck, swallow, breathe pattern, with audible swallowing, and evident milk transfer, both breasts offered, baby is asleep following feeding. Continues to use the nipple shield. Baby is feeding on demand, voiding and stools present as appropriate since birth. Weight loss:  5.5%    Breasts may become engorged when milk \"comes in\".  How milk is made / normal phases of milk production, supply and demand discussed.  Taught care of engorged breasts - frequent breastfeeding encouraged and breast massage ac. Then nurse the baby (or pump minimally for comfort). Apply cold compresses ac and/or pc x 15 minutes a few times a day for swelling or discomfort if necessary.  May need to do this care for a couple of days.Discussed prevention and treatment of mastitis.

## 2024-07-30 NOTE — DISCHARGE INSTRUCTIONS
After Your Delivery Discharge Instructions    After Discharge Orders:  No future appointments.       Medical equipment: Breast pump     Call the Physician with any  signs and symptoms:    Warning signs regarding incision:  \"Popping\" of stitches or staples  Foul smelling discharge or pus  More redness or streaks around incision than before       After your delivery - signs and symptoms to watch for:  Fever - Oral temperature greater than 100.4 degrees Fahrenheit  Foul-smelling vaginal discharge  Headache unrelieved by \"pain meds\"  Difficulty urinating  Breasts reddened, hard, hot to the touch  Nipple discharge which is foul-smelling or contains pus  Increased pain at the site of the laceration  Difficulty breathing with or without chest pain  New calf pain especially if only on one side  Sudden, continuing increased vaginal bleeding with or without clots  Unrelieved feelings of:  Inability to cope  Sadness  Anxiety  Lack of interest in baby  Insomnia  Crying     What to do at home:  See patient education handouts for full information  Resume activity gradually   Don't lift anything heavier than baby and carrier until OK'd by your Physician or Midwife  No sex until OK'd by your Physician or Midwife  Take care of yourself by sleeping/resting as much as possible  Eat regular nutritious meals  Let someone else care for you, your baby, and housework as much as possible   Take pain medication as prescribed whenever you need them  Wear compression stockings if prescribed   To avoid/relieve constipation take stool softeners if advised   Drink lots of water/fruit juices  Increase fiber in your diet  Breast care: Wear support bra ; use lanolin ointment/cream as needed     Refer to [place] Discharge Instructions for problems or follow-up regarding  nursing